# Patient Record
Sex: MALE | Race: WHITE
[De-identification: names, ages, dates, MRNs, and addresses within clinical notes are randomized per-mention and may not be internally consistent; named-entity substitution may affect disease eponyms.]

---

## 2019-06-28 ENCOUNTER — HOSPITAL ENCOUNTER (EMERGENCY)
Dept: HOSPITAL 47 - EC | Age: 59
Discharge: HOME | End: 2019-06-28
Payer: COMMERCIAL

## 2019-06-28 VITALS
DIASTOLIC BLOOD PRESSURE: 78 MMHG | SYSTOLIC BLOOD PRESSURE: 111 MMHG | HEART RATE: 85 BPM | TEMPERATURE: 98.2 F | RESPIRATION RATE: 16 BRPM

## 2019-06-28 DIAGNOSIS — Z53.29: ICD-10-CM

## 2019-06-28 DIAGNOSIS — Z91.041: ICD-10-CM

## 2019-06-28 DIAGNOSIS — N20.0: Primary | ICD-10-CM

## 2019-06-28 DIAGNOSIS — R19.7: ICD-10-CM

## 2019-06-28 LAB
ALBUMIN SERPL-MCNC: 4.5 G/DL (ref 3.5–5)
ALP SERPL-CCNC: 57 U/L (ref 38–126)
ALT SERPL-CCNC: 50 U/L (ref 21–72)
AMYLASE SERPL-CCNC: 61 U/L (ref 30–110)
ANION GAP SERPL CALC-SCNC: 7 MMOL/L
APTT BLD: 27 SEC (ref 22–30)
AST SERPL-CCNC: 49 U/L (ref 17–59)
BASOPHILS # BLD AUTO: 0 K/UL (ref 0–0.2)
BASOPHILS NFR BLD AUTO: 1 %
BUN SERPL-SCNC: 23 MG/DL (ref 9–20)
CALCIUM SPEC-MCNC: 9.6 MG/DL (ref 8.4–10.2)
CHLORIDE SERPL-SCNC: 103 MMOL/L (ref 98–107)
CO2 SERPL-SCNC: 30 MMOL/L (ref 22–30)
EOSINOPHIL # BLD AUTO: 0.1 K/UL (ref 0–0.7)
EOSINOPHIL NFR BLD AUTO: 2 %
ERYTHROCYTE [DISTWIDTH] IN BLOOD BY AUTOMATED COUNT: 5.55 M/UL (ref 4.3–5.9)
ERYTHROCYTE [DISTWIDTH] IN BLOOD: 12.9 % (ref 11.5–15.5)
GLUCOSE SERPL-MCNC: 117 MG/DL (ref 74–99)
HCT VFR BLD AUTO: 52.2 % (ref 39–53)
HGB BLD-MCNC: 16.7 GM/DL (ref 13–17.5)
INR PPP: 0.9 (ref ?–1.2)
LIPASE SERPL-CCNC: 57 U/L (ref 23–300)
LYMPHOCYTES # SPEC AUTO: 0.8 K/UL (ref 1–4.8)
LYMPHOCYTES NFR SPEC AUTO: 15 %
MCH RBC QN AUTO: 30.2 PG (ref 25–35)
MCHC RBC AUTO-ENTMCNC: 32.1 G/DL (ref 31–37)
MCV RBC AUTO: 94.1 FL (ref 80–100)
MONOCYTES # BLD AUTO: 0.4 K/UL (ref 0–1)
MONOCYTES NFR BLD AUTO: 7 %
NEUTROPHILS # BLD AUTO: 3.7 K/UL (ref 1.3–7.7)
NEUTROPHILS NFR BLD AUTO: 72 %
PH UR: 5.5 [PH] (ref 5–8)
PLATELET # BLD AUTO: 194 K/UL (ref 150–450)
POTASSIUM SERPL-SCNC: 4.5 MMOL/L (ref 3.5–5.1)
PROT SERPL-MCNC: 7.3 G/DL (ref 6.3–8.2)
PT BLD: 10.2 SEC (ref 9–12)
SODIUM SERPL-SCNC: 140 MMOL/L (ref 137–145)
SP GR UR: 1.02 (ref 1–1.03)
UROBILINOGEN UR QL STRIP: <2 MG/DL (ref ?–2)
WBC # BLD AUTO: 5.1 K/UL (ref 3.8–10.6)

## 2019-06-28 PROCEDURE — 85025 COMPLETE CBC W/AUTO DIFF WBC: CPT

## 2019-06-28 PROCEDURE — 99284 EMERGENCY DEPT VISIT MOD MDM: CPT

## 2019-06-28 PROCEDURE — 81003 URINALYSIS AUTO W/O SCOPE: CPT

## 2019-06-28 PROCEDURE — 85730 THROMBOPLASTIN TIME PARTIAL: CPT

## 2019-06-28 PROCEDURE — 85610 PROTHROMBIN TIME: CPT

## 2019-06-28 PROCEDURE — 82150 ASSAY OF AMYLASE: CPT

## 2019-06-28 PROCEDURE — 36415 COLL VENOUS BLD VENIPUNCTURE: CPT

## 2019-06-28 PROCEDURE — 80053 COMPREHEN METABOLIC PANEL: CPT

## 2019-06-28 PROCEDURE — 96360 HYDRATION IV INFUSION INIT: CPT

## 2019-06-28 PROCEDURE — 76705 ECHO EXAM OF ABDOMEN: CPT

## 2019-06-28 PROCEDURE — 83690 ASSAY OF LIPASE: CPT

## 2019-06-28 PROCEDURE — 74018 RADEX ABDOMEN 1 VIEW: CPT

## 2019-06-28 RX ADMIN — FAMOTIDINE STA MG: 10 INJECTION, SOLUTION INTRAVENOUS at 08:45

## 2019-06-28 RX ADMIN — FAMOTIDINE STA: 10 INJECTION, SOLUTION INTRAVENOUS at 08:46

## 2019-06-28 NOTE — ED
General Adult HPI





- General


Chief complaint: Abdominal Pain


Stated complaint: Back and Abd pain


Time Seen by Provider: 06/28/19 07:04


Source: patient, RN notes reviewed


Mode of arrival: wheelchair


Limitations: no limitations





- History of Present Illness


Initial comments: 





58-year-old male presents to the emergency department for a chief complaint of 

flank and abdominal pain.  Patient states this has been ongoing for about 3 

weeks but is worsening.  States that it worsened last night.  States it is a 

sharp pain that is in his right flank and radiates around to his right upper 

abdomen.  Patient states this pain was intermittent until last night and now it 

is constant.  States that eating used to make it better although he noticed 

bloating afterwards.  States that now it seems to hurt more when he lays on that

side.  He admits to some mild nausea this morning but denies vomiting.  Admits 

to 2 episodes of diarrhea this morning.  Denies any lower abdominal pain.  

Denies any fevers.  Denies any previous abdominal surgeries.Patient has no other

complaints at this time including shortness of breath, chest pain, vomiting, 

headache, or visual changes.





- Related Data


                                Home Medications











 Medication  Instructions  Recorded  Confirmed


 


No Known Home Medications  06/28/19 06/28/19








                                  Previous Rx's











 Medication  Instructions  Recorded


 


Omeprazole 20 mg PO DAILY #20 tablet. 06/28/19











                                    Allergies











Allergy/AdvReac Type Severity Reaction Status Date / Time


 


Iodinated Contrast- Oral and Allergy  Unknown Verified 06/28/19 10:01





IV Dye   Childhood  














Review of Systems


ROS Statement: 


Those systems with pertinent positive or pertinent negative responses have been 

documented in the HPI.





ROS Other: All systems not noted in ROS Statement are negative.





Past Medical History


Past Medical History: No Reported History


History of Any Multi-Drug Resistant Organisms: None Reported


Past Surgical History: Orthopedic Surgery


Additional Past Surgical History / Comment(s): shoulder, knee, ankle


Past Psychological History: No Psychological Hx Reported


Smoking Status: Never smoker


Past Alcohol Use History: None Reported


Past Drug Use History: None Reported





General Exam


Limitations: no limitations


General appearance: alert, in no apparent distress (sitting up in chair, no 

distress)


Head exam: Present: atraumatic, normocephalic, normal inspection


Eye exam: Present: normal appearance, PERRL, EOMI.  Absent: scleral icterus, 

conjunctival injection, periorbital swelling


ENT exam: Present: normal exam, mucous membranes moist


Neck exam: Present: normal inspection, full ROM.  Absent: tenderness, 

meningismus


Respiratory exam: Present: normal lung sounds bilaterally.  Absent: respiratory 

distress, wheezes, rales, rhonchi, stridor


Cardiovascular Exam: Present: regular rate, normal rhythm, normal heart sounds. 

Absent: systolic murmur, diastolic murmur, rubs, gallop, clicks


GI/Abdominal exam: Present: soft, tenderness (mild tenderness RUQ, neg barrera 

sign. no lower abdominal tenderness, no epigastric tendernss, no LUQ tenderness,

), normal bowel sounds.  Absent: distended, guarding (no guarding or rebound), 

rebound, rigid


Back exam: Absent: CVA tenderness (R), CVA tenderness (L)


Neurological exam: Present: alert, oriented X3, CN II-XII intact


Psychiatric exam: Present: normal affect, normal mood


Skin exam: Present: warm, dry, intact, normal color.  Absent: rash





Course


                                   Vital Signs











  06/28/19 06/28/19





  06:55 09:51


 


Temperature 98.0 F 98.2 F


 


Pulse Rate 99 85


 


Respiratory 18 16





Rate  


 


Blood Pressure 185/85 111/78


 


O2 Sat by Pulse 99 97





Oximetry  














Medical Decision Making





- Medical Decision Making





58-year-old male presents to the emergency department for right upper quadrant 

pain radiating from right flank.  Vitals are stable, patient was initially 

hypertensive 5 or this did normalize for his stay, likely due to pain or a

nxiety.  Patient does have some mild tenderness of the right upper quadrant.  

However exam overall is unremarkable in nature.  No guarding or rebound.  CBC 

CMP also unremarkable.  Ultrasound shows a borderline liver size, no evidence of

cholelithiasis or biliary ductal dilation.  There is a possible tiny 3 mm 

nonobstructing right renal calculus which was discussed with patient.  Patient 

given Pepcid but did not want any pain medication.  Reevaluated, patient denying

any pain whatsoever at this time.  At this point I believe patient should follow

up with GI for possible HIDA scan and return here if he has any worsening 

symptoms.  Will also be started on omeprazole for possible gastritis.





- Lab Data


Result diagrams: 


                                 06/28/19 07:48





                                 06/28/19 07:48


                                   Lab Results











  06/28/19 06/28/19 06/28/19 Range/Units





  07:47 07:48 07:48 


 


WBC    5.1  (3.8-10.6)  k/uL


 


RBC    5.55  (4.30-5.90)  m/uL


 


Hgb    16.7  (13.0-17.5)  gm/dL


 


Hct    52.2  (39.0-53.0)  %


 


MCV    94.1  (80.0-100.0)  fL


 


MCH    30.2  (25.0-35.0)  pg


 


MCHC    32.1  (31.0-37.0)  g/dL


 


RDW    12.9  (11.5-15.5)  %


 


Plt Count    194  (150-450)  k/uL


 


Neutrophils %    72  %


 


Lymphocytes %    15  %


 


Monocytes %    7  %


 


Eosinophils %    2  %


 


Basophils %    1  %


 


Neutrophils #    3.7  (1.3-7.7)  k/uL


 


Lymphocytes #    0.8 L  (1.0-4.8)  k/uL


 


Monocytes #    0.4  (0-1.0)  k/uL


 


Eosinophils #    0.1  (0-0.7)  k/uL


 


Basophils #    0.0  (0-0.2)  k/uL


 


PT     (9.0-12.0)  sec


 


INR     (<1.2)  


 


APTT     (22.0-30.0)  sec


 


Sodium   140   (137-145)  mmol/L


 


Potassium   4.5   (3.5-5.1)  mmol/L


 


Chloride   103   ()  mmol/L


 


Carbon Dioxide   30   (22-30)  mmol/L


 


Anion Gap   7   mmol/L


 


BUN   23 H   (9-20)  mg/dL


 


Creatinine   1.08   (0.66-1.25)  mg/dL


 


Est GFR (CKD-EPI)AfAm   88   (>60 ml/min/1.73 sqM)  


 


Est GFR (CKD-EPI)NonAf   76   (>60 ml/min/1.73 sqM)  


 


Glucose   117 H   (74-99)  mg/dL


 


Calcium   9.6   (8.4-10.2)  mg/dL


 


Total Bilirubin   0.6   (0.2-1.3)  mg/dL


 


AST   49   (17-59)  U/L


 


ALT   50   (21-72)  U/L


 


Alkaline Phosphatase   57   ()  U/L


 


Total Protein   7.3   (6.3-8.2)  g/dL


 


Albumin   4.5   (3.5-5.0)  g/dL


 


Amylase   61   ()  U/L


 


Lipase   57   ()  U/L


 


Urine Color  Yellow    


 


Urine Appearance  Clear    (Clear)  


 


Urine pH  5.5    (5.0-8.0)  


 


Ur Specific Gravity  1.016    (1.001-1.035)  


 


Urine Protein  Negative    (Negative)  


 


Urine Glucose (UA)  Negative    (Negative)  


 


Urine Ketones  Negative    (Negative)  


 


Urine Blood  Negative    (Negative)  


 


Urine Nitrite  Negative    (Negative)  


 


Urine Bilirubin  Negative    (Negative)  


 


Urine Urobilinogen  <2.0    (<2.0)  mg/dL


 


Ur Leukocyte Esterase  Negative    (Negative)  














  06/28/19 Range/Units





  07:48 


 


WBC   (3.8-10.6)  k/uL


 


RBC   (4.30-5.90)  m/uL


 


Hgb   (13.0-17.5)  gm/dL


 


Hct   (39.0-53.0)  %


 


MCV   (80.0-100.0)  fL


 


MCH   (25.0-35.0)  pg


 


MCHC   (31.0-37.0)  g/dL


 


RDW   (11.5-15.5)  %


 


Plt Count   (150-450)  k/uL


 


Neutrophils %   %


 


Lymphocytes %   %


 


Monocytes %   %


 


Eosinophils %   %


 


Basophils %   %


 


Neutrophils #   (1.3-7.7)  k/uL


 


Lymphocytes #   (1.0-4.8)  k/uL


 


Monocytes #   (0-1.0)  k/uL


 


Eosinophils #   (0-0.7)  k/uL


 


Basophils #   (0-0.2)  k/uL


 


PT  10.2  (9.0-12.0)  sec


 


INR  0.9  (<1.2)  


 


APTT  27.0  (22.0-30.0)  sec


 


Sodium   (137-145)  mmol/L


 


Potassium   (3.5-5.1)  mmol/L


 


Chloride   ()  mmol/L


 


Carbon Dioxide   (22-30)  mmol/L


 


Anion Gap   mmol/L


 


BUN   (9-20)  mg/dL


 


Creatinine   (0.66-1.25)  mg/dL


 


Est GFR (CKD-EPI)AfAm   (>60 ml/min/1.73 sqM)  


 


Est GFR (CKD-EPI)NonAf   (>60 ml/min/1.73 sqM)  


 


Glucose   (74-99)  mg/dL


 


Calcium   (8.4-10.2)  mg/dL


 


Total Bilirubin   (0.2-1.3)  mg/dL


 


AST   (17-59)  U/L


 


ALT   (21-72)  U/L


 


Alkaline Phosphatase   ()  U/L


 


Total Protein   (6.3-8.2)  g/dL


 


Albumin   (3.5-5.0)  g/dL


 


Amylase   ()  U/L


 


Lipase   ()  U/L


 


Urine Color   


 


Urine Appearance   (Clear)  


 


Urine pH   (5.0-8.0)  


 


Ur Specific Gravity   (1.001-1.035)  


 


Urine Protein   (Negative)  


 


Urine Glucose (UA)   (Negative)  


 


Urine Ketones   (Negative)  


 


Urine Blood   (Negative)  


 


Urine Nitrite   (Negative)  


 


Urine Bilirubin   (Negative)  


 


Urine Urobilinogen   (<2.0)  mg/dL


 


Ur Leukocyte Esterase   (Negative)  














Disposition


Clinical Impression: 


 Right upper quadrant abdominal pain





Disposition: HOME SELF-CARE


Condition: Good


Instructions (If sedation given, give patient instructions):  Abdominal Pain 

(ED)


Additional Instructions: 


Please take medication as directed.  Please follow up with GI in 1-2 days.  If 

you're having any worsening symptoms return here to the emergency department.


Prescriptions: 


Omeprazole 20 mg PO DAILY #20 tablet.dr


Is patient prescribed a controlled substance at d/c from ED?: No


Referrals: 


Charlette Hein MD [STAFF PHYSICIAN] - 


Lexii Nguyen MD [Primary Care Provider] - 1-2 days

## 2019-06-28 NOTE — US
EXAMINATION TYPE: US abdomen limited

 

DATE OF EXAM: 6/28/2019

 

COMPARISON: NONE

 

CLINICAL HISTORY: 58-year-old male Right sided pain 

 

TECHNIQUE: Multiple sonographic images of the right upper quadrant are obtained.

 

FINDINGS:

 

EXAM MEASUREMENTS:

 

Liver Length:  17.1 cm   

Gallbladder Wall:  0.1 cm   

CBD:  05 cm

Right Kidney:  12.6 x 5.0 x 5.3 cm

 

 

 

Pancreas:  Obscured by bowel gas

Liver:  Measuring upper limits of normal  

Gallbladder:  wnl

**Evidence for sonographic Dolan's sign:  No

CBD:  wnl 

Right Kidney:  No hydronephrosis. Possible non-obstructing stone visualized upper pole measuring 0.3 
cm   

 

 

 

IMPRESSION: 

1. Borderline size liver (17.1 cm) but with otherwise unremarkable sonographic appearance.

2. Possible tiny 3 mm nonobstructive right renal calculus.

3. No evidence for cholelithiasis or biliary ductal dilatation.

## 2019-06-28 NOTE — XR
EXAMINATION TYPE: XR KUB

 

DATE OF EXAM: 6/28/2019

 

CLINICAL DATA:  58-year-old male right posterior flank pain, nausea and vomiting, PHH

 

COMPARISON:  None

 

FINDINGS:

 

Lung bases are clear. 

 

No evidence for free intraperitoneal air. 

 

No dilated small bowel or air-fluid levels. Scattered air and stool seen throughout the colon extendi
ng distally into the rectum.

 

Phlebolith in the low right pelvis.

 

Mild reverse S-shaped curvature of the spine.

 

 

 

IMPRESSION:

 

No evidence of bowel obstruction or free intraperitoneal air.